# Patient Record
(demographics unavailable — no encounter records)

---

## 2017-02-19 NOTE — EDDOCDS
Physician Documentation                                                                           

[facility]                                                                         

Name: Rupa Hightower                                                                                

Age: 75 yrs                                                                                       

Sex: Female                                                                                       

: 1941                                                                                   

MRN: [id number]                                                                                     

Arrival Date: 2017                                                                          

Time: 23:59                                                                                       

Account#: [id number]                                                                              

Bed 15                                                                                            

Private MD:                                                                                       

Disposition:                                                                                      

17 03:24 Discharged to Home/Self Care. Impression: Nausea and vomiting, Diarrhea,           

unspecified, Hypomagnesemia.                                                                    

- Condition is Stable.                                                                            

- Discharge Instructions: Diarrhea, Hypomagnesemia, Nausea and Vomiting.                          

- Prescriptions for Zofran 4 mg Oral Tablet - take 1 tablet by ORAL route 4 times per             

day As needed; 10 tablet.                                                                       

- Medication Reconciliation, Local Pharmacy Hours form.                                           

- Follow up: Johnathon Fatima; When: Call to arrange an appointment; Reason: Continuance of            

care.                                                                                           

- Problem is an acute exacerbation.                                                               

- Symptoms have improved.                                                                         

                                                                                                  

                                                                                                  

                                                                                                  

Historical:                                                                                       

- Allergies: hydrocodone; lactose intolerant;                                                     

- Home Meds:                                                                                      

1. metoprolol tartrate 50 mg Oral tab 1 tab 2 times per day                                     

2. pantoprazole 40 mg oral TbEC 1 tab once daily                                                

3. montelukast 10 mg oral tab 1 tab once daily                                                  

4. meloxicam 15 mg oral tab 1 tab once daily                                                    

5. epinastine 0.05 % ophthalmic drop 1 drop 2 times per day                                     

6. fluticasone 50 mcg/actuation nasal spsn 2 sprays once daily                                  

7. atorvastatin 10 mg oral tab 3 times a weeks                                                  

8. Vagifem 10 mcg vaginal tab 1 tab 2 times per wk                                              

9. amoxicillin 500 mg Oral cap 4 caps prior to dental visit                                     

10. Vitamin D3 1,000 unit oral cap daily                                                        

11. B-Complex oral tab daily                                                                    

12. glucosamine-chondroitin oral oral                                                           

13. Allegra Allergy oral oral daily                                                             

14. aspirin 81 mg Oral tab 1 tab once daily                                                     

- PMHx: Non-Hodgkin's Lymphoma; congenital hip dislocation (left); Shingles;                      

mononucleosis; Hypertension; High Cholesterol; GERD;                                            

- PSHx: Appendectomy; Cholecystectomy; Hysterectomy; Tonsillectomy; Hip surgery;                  

surgery for endometriosis;                                                                      

- Social history: Smoking status: Patient states was never smoker of tobacco. No                  

barriers to communication noted, The patient speaks fluent English, Speaks                      

appropriately for age.                                                                          

- Family history: Not pertinent.                                                                  

- : The pt / caregiver states he / she is not on anticoagulants. Home medication list             

is obtained from the patient.                                                                   

- Exposure Risk Screening:: None identified.                                                      

                                                                                                  

                                                                                                  

Vital Signs:                                                                                      

                                                                                             

00:07  / 69; Pulse 88; Resp 18; Temp 96.5(TE); Pulse Ox 97% on R/A; Weight 79.38 kg /   [place] 

      175 lbs (R); Height 5 ft. 7 in. (170.18 cm) (R);                                            

03:38  / 83; Pulse 107; Resp 18; Temp 98.4(O); Pulse Ox 94% on R/A; Pain 0/10;          [place] 

00:07 Body Mass Index 27.41 (79.38 kg, 170.18 cm)                                             [place] 

                                                                                                  

MDM:                                                                                              

00:46 Ondansetron 4 mg IVP once ordered.                                                      mm11

00:46 IV Saline Lock ordered.                                                                 mm11

00:46 Undress patient appropriately for examination ordered.                                  mm11

00:46 LR Solution 1000 ml IV at bolus once ordered.                                           mm11

00:47 Amylase Ordered.                                                                        EDMS

00:47 Basic Metabolic Profile Ordered.                                                        EDMS

00:47 CBC with Diff Ordered.                                                                  EDMS

00:47 Cardiac Injury Profile Ordered.                                                         EDMS

00:47 Lipase Ordered.                                                                         EDMS

00:47 Liver Profile Ordered.                                                                  EDMS

00:47 Troponin Ordered.                                                                       EDMS

00:47 Magnesium Level Ordered.                                                                EDMS

00:47 NOTHING BY MOUTH+DIET ordered.                                                          EDMS

01:22 Financial registration complete.                                                        pm4 

02:00 Basic Metabolic Profile Reviewed.                                                       mm11

02:00 CBC with Diff Reviewed.                                                                 mm11

02:00 Liver Profile Reviewed.                                                                 mm11

02:00 Magnesium Level Reviewed.                                                               mm11

02:00 Amylase Reviewed.                                                                       mm11

02:00 Cardiac Injury Profile Reviewed.                                                        mm11

02:00 Lipase Reviewed.                                                                        mm11

02:00 Troponin Reviewed.                                                                      mm11

03:14 Magnesium Oxide 400 mg PO once ordered.                                                 mm11

03:17 [facility] Payment Agreement was scanned into [employer] and attached to record.               gjb 

                                                                                                  

Administered Medications:                                                                         

00:56 Drug: Ondansetron 4 mg [ondansetron HCl 2 mg/mL intravenous solution (2 mL)] Route:     [email or web address]

      IVP; Site: left antecubital;                                                                

00:56 Drug: LR 1000 ml [lactated ringers intravenous solution] Route: IV; Rate: bolus; Site:  js15

      left antecubital;                                                                           

03:58 Follow up: IV Intake: 600ml                                                             [email or web address]

03:58 Drug: Magnesium Oxide 400 mg [magnesium oxide 400 mg tablet (1 tabs)] Route: PO;        js15

                                                                                                  

                                                                                                  

Signatures:                                                                                       

Dispatcher [employer]                           Bob Gudino DO DO   mm11                                                 

Ursula AndersonRN                        RN   [id number]                                                 

Shannon Benites Paul, Reg                     Reg  pm4                                                  

                                                                                                  

The chart was reviewed and I authenticate all verbal orders and agree with the evaluation and 
treatment provided.Attachments:

03:17 [facility] Payment Agreement                                                                daya 

                                                                                                  

**************************************************************************************************

MTDD

## 2017-02-19 NOTE — EDDOCDS
Nurse's Notes                                                                                     

[facility]                                                                         

Name: Rupa Hightower                                                                                

Age: 75 yrs                                                                                       

Sex: Female                                                                                       

: 1941                                                                                   

MRN: [id number]                                                                                     

Arrival Date: 2017                                                                          

Time: 23:59                                                                                       

Account#: [id number]                                                                              

Bed 15                                                                                            

Private MD:                                                                                       

Diagnosis: Nausea and vomiting;Diarrhea, unspecified;Hypomagnesemia                               

                                                                                                  

Presentation:                                                                                     

                                                                                             

00:07 Presenting complaint: EMS states: Pt has N/V/D that started earlier this evening; pt    js15

      states that she has hx of becoming very dehydrated when this happens. Adult Sepsis          

      Screening: The patient does not have new or worsening altered mentation. Patient's          

      respiratory rate is less than 22. Systolic blood pressure is greater than 100. Patient      

      has a qSOFA score of 0- Negative Sepsis Screen. Suicide/Homicide risk assessment- the       

      patient denies having any suicidal and/or homicidal ideations and does not present with     

      any other emotional, behavioral or mental health complaints.  Status: Patient       

      is not a  or  dependent. Transition of care: patient was not          

      received from another setting of care.                                                      

00:07 Acuity: IDA Level 3                                                                     js15

00:07 Method Of Arrival: Ambulance                                                            js15

                                                                                                  

Triage Assessment:                                                                                

00:18 General: Appears uncomfortable, Behavior is anxious, appropriate for age, cooperative.  js15

      Pain: Location: back Pain currently is 6 out of 10 on a pain scale. The patient is          

      triaged at the bedside. See Assessment in Nurses Notes section of ED record.                

      Neurological: Level of Consciousness is awake, alert, obeys commands, Oriented to           

      person, place, time. Respiratory: Airway is patent Respiratory effort is even,              

      unlabored, Respiratory pattern is regular, symmetrical. Derm: Skin is pink, warm & dry.   

                                                                                                  

Historical:                                                                                       

- Allergies: hydrocodone; lactose intolerant;                                                     

- Home Meds:                                                                                      

1. metoprolol tartrate 50 mg Oral tab 1 tab 2 times per day                                     

2. pantoprazole 40 mg oral TbEC 1 tab once daily                                                

3. montelukast 10 mg oral tab 1 tab once daily                                                  

4. meloxicam 15 mg oral tab 1 tab once daily                                                    

5. epinastine 0.05 % ophthalmic drop 1 drop 2 times per day                                     

6. fluticasone 50 mcg/actuation nasal spsn 2 sprays once daily                                  

7. atorvastatin 10 mg oral tab 3 times a weeks                                                  

8. Vagifem 10 mcg vaginal tab 1 tab 2 times per wk                                              

9. amoxicillin 500 mg Oral cap 4 caps prior to dental visit                                     

10. Vitamin D3 1,000 unit oral cap daily                                                        

11. B-Complex oral tab daily                                                                    

12. glucosamine-chondroitin oral oral                                                           

13. Allegra Allergy oral oral daily                                                             

14. aspirin 81 mg Oral tab 1 tab once daily                                                     

- PMHx: Non-Hodgkin's Lymphoma; congenital hip dislocation (left); Shingles;                      

mononucleosis; Hypertension; High Cholesterol; GERD;                                            

- PSHx: Appendectomy; Cholecystectomy; Hysterectomy; Tonsillectomy; Hip surgery;                  

surgery for endometriosis;                                                                      

- Social history: Smoking status: Patient states was never smoker of tobacco. No                  

barriers to communication noted, The patient speaks fluent English, Speaks                      

appropriately for age.                                                                          

- Family history: Not pertinent.                                                                  

- : The pt / caregiver states he / she is not on anticoagulants. Home medication list             

is obtained from the patient.                                                                   

- Exposure Risk Screening:: None identified.                                                      

                                                                                                  

                                                                                                  

Screenin:54 Screening information is obtained from the patient. Fall risk: No risks identified.     js15

      Assistance ADL's: requires no assistance with activities of daily living. Abuse/DV          

      Screen: The patient / caregiver reports he/she is: not in a situation that causes fear,     

      pain or injury. Nutritional screening: No deficits noted. Advance Directives: There is      

      no active DNR order. home support is adequate.                                              

                                                                                                  

Assessment:                                                                                       

00:15 General: see triage note.                                                               js15

01:52 Reassessment: Patient appears in no apparent distress at this time. Patient states      js15

      feeling better. Patient states symptoms have improved. Pt resting on stretcher, using       

      ipad; respirations even and unlabored; skin pink, warm, dry.                                

02:45 Reassessment: Patient appears in no apparent distress at this time. Patient states      js15

      feeling better. Patient states symptoms have improved. Pt resting on stretcher,             

      respirations even and unlabored; skin pink, warm, dry.                                      

03:59 General: Appears in no apparent distress, comfortable, Behavior is appropriate for age, js15

      cooperative. Pain: Denies pain. Neurological: Level of Consciousness is awake, alert,       

      obeys commands, Oriented to person, place, time. Respiratory: Airway is patent              

      Respiratory effort is even, unlabored, Respiratory pattern is regular, symmetrical. GI:     

      Bowel sounds present X 4 quads. Abd is soft and non tender X 4 quads. Derm: Skin is         

      pink, warm & dry.                                                                         

                                                                                                  

Vital Signs:                                                                                      

00:07  / 69; Pulse 88; Resp 18; Temp 96.5(TE); Pulse Ox 97% on R/A; Weight 79.38 kg     [place] 

      (R); Height 5 ft. 7 in. (170.18 cm) (R);                                                    

03:38  / 83; Pulse 107; Resp 18; Temp 98.4(O); Pulse Ox 94% on R/A; Pain 0/10;          [place] 

00:07 Body Mass Index 27.41 (79.38 kg, 170.18 cm)                                             [place] 

                                                                                                  

Vitals:                                                                                           

00:18 Log In Time N/A - ambulance arrival.                                                    js15

                                                                                                  

ED Course:                                                                                        

00:01 Patient visited by Lopresti, Mary-Elizabeth, Unit Clerk.                                ml3 

00:01 Danielle Vargas,JAMIL is Primary Nurse.                                                  ml3 

00:01 Patient moved to Waiting                                                                ml3 

00:01 Patient moved to 15                                                                     ml3 

00:07 Patient visited by Christina Pruett PCA.                                                 [place] 

00:09 Triage Initiated                                                                        js15

00:39 Bob Jacob DO is Attending Physician.                                            mm11

00:39 Patient visited by Bob Jacob DO.                                                mm11

00:45 Patient visited by Bob Jacob DO.                                                mm11

00:50 Inserted saline lock: 20 gauge in left antecubital area The patient tolerated the       js15

      procedure well.                                                                             

00:51 Magnesium Level Sent.                                                                   js15

00:51 Amylase Sent.                                                                           js15

00:51 Basic Metabolic Profile Sent.                                                           js15

00:51 CBC with Diff Sent.                                                                     js15

00:51 Cardiac Injury Profile Sent.                                                            js15

00:51 Lipase Sent.                                                                            js15

00:51 Liver Profile Sent.                                                                     js15

00:51 Troponin Sent.                                                                          js15

00:54 Primary Nurse role handed off by Danielle Vargas,JAMIL                                   [place] 

01:50 Patient visited by Ursula Anderson RN.                                                    js15

03:04 Patient visited by Christina Pruett PCA.                                                 [place] 

03:16 Patient name changed from Rupa\S\\S\Inwood\S\ to Rupa\S\ \S\Inwood.                          
   EDMS

03:17 [facility] Payment Agreement was scanned into [employer] and attached to record.               gjb 

03:23 Johnathon Fatima is Referral Physician.                                                      mm11

03:39 Patient visited by Christina Pruett PCA.                                                 [place] 

03:59 The patient / caregiver is instructed regarding the plan of care and ED course.         js15

04:01 Discontinued IV lock intact, bleeding controlled, pressure dressing applied, No         js15

      redness/swelling at site. No procedures done that require assistance.                       

                                                                                                  

Administered Medications:                                                                         

00:56 Drug: Ondansetron 4 mg [ondansetron HCl 2 mg/mL intravenous solution (2 mL)] Route:     js15

      IVP; Site: left antecubital;                                                                

00:56 Drug: LR 1000 ml [lactated ringers intravenous solution] Route: IV; Rate: bolus; Site:  js15

      left antecubital;                                                                           

03:58 Follow up: IV Intake: 600ml                                                             js15

03:58 Drug: Magnesium Oxide 400 mg [magnesium oxide 400 mg tablet (1 tabs)] Route: PO;        js15

                                                                                                  

                                                                                                  

Intake:                                                                                           

03:58 IV: 600.00ml; Total: 600.00ml.                                                          js15

                                                                                                  

Order Results:                                                                                    

Lab Order: Amylase; SPEC'M 17 00:49                                                         

      Test: AMYLASE; Value: 47; Range: ; Units: U/L; Status: F                              

Lab Order: Basic Metabolic Profile; SPEC'M 17 00:49                                         

      Test: GLUCOSE, FASTING; Value: 106; Range: ; Units: MG/DL; Status: F                  

      Test: BLOOD UREA NITROGEN; Value: 19; Range: 7-18; Abnormal: Above high normal; Units:      

      MG/DL; Status: F                                                                            

      Test: CREATININE FOR GFR; Value: 1.21; Range: 0.55-1.02; Abnormal: Above high normal;       

      Units: MG/DL; Status: F                                                                     

      Test: GLOMERULAR FILTRATION RATE; Value: 46.2; Range: >39; Status: F                        

      Test: SODIUM LEVEL; Value: 140; Range: 136-145; Units: MEQ/L; Status: F                     

      Test: POTASSIUM SERUM; Value: 4.2; Range: 3.5-5.1; Units: MEQ/L; Status: F                  

      Test: CHLORIDE LEVEL; Value: 103; Range: ; Units: MEQ/L; Status: F                    

      Test: CARBON DIOXIDE LEVEL; Value: 25; Range: 21-32; Units: MEQ/L; Status: F                

      Test: ANION GAP; Value: 12; Range: 8-16; Units: MEQ/L; Status: F                            

      Test: CALCIUM LEVEL; Value: 9.4; Range: 8.8-10.2; Units: MG/DL; Status: F                   

      Test Note: &nbsp;; Units are mL/min/1.73 m2 Chronic Kidney Disease Staging per NKF:       

      Stage I & II GFR >=60 Normal to Mildly Decreased Stage III GFR 30-59 Moderately           

      Decreased Stage IV GFR 15-29 Severely Decreased Stage V GFR <15 Very Little GFR Left        

      ESRD GFR <15 on RRT                                                                         

Lab Order: CBC with Diff; SPEC'M 17 00:49                                                   

      Test: WHITE BLOOD COUNT; Value: 14.8; Range: 4.0-10.0; Abnormal: Above high normal;         

      Units: K/mm3; Status: F                                                                     

      Test: RED BLOOD COUNT; Value: 5.31; Range: 4.00-5.40; Units: M/mm3; Status: F               

      Test: HEMOGLOBIN; Value: 17.2; Range: 12.0-16.0; Abnormal: Above high normal; Units:        

      g/dl; Status: F                                                                             

      Test: HEMATOCRIT; Value: 52.1; Range: 36.0-47.0; Abnormal: Above high normal; Units: %;     

      Status: F                                                                                   

      Test: MEAN CORPUSCULAR VOLUME; Value: 98.1; Range: 80.0-96.0; Abnormal: Above high          

      normal; Units: fl; Status: F                                                                

      Test: MEAN CORPUSCULAR HEMOGLOBIN; Value: 32.4; Range: 27.0-33.0; Units: pg; Status: F      

      Test: MEAN CORPUSCULAR HGB CONC; Value: 33.1; Range: 32.0-36.5; Units: g/dl; Status: F      

      Test: RED CELL DISTRIBUTION WIDTH; Value: 13.0; Range: 11.5-14.5; Units: %; Status: F       

      Test: PLATELET COUNT, AUTOMATED; Value: 217; Range: 150-450; Units: k/mm3; Status: F        

      Test: NEUTROPHILS %; Value: 86.2; Range: 36.0-66.0; Abnormal: Above high normal; Units:     

      %; Status: F                                                                                

      Test: LYMPH %; Value: 6.0; Range: 24.0-44.0; Abnormal: Below low normal; Units: %;          

      Status: F                                                                                   

      Test: MONO %; Value: 6.0; Range: 0.0-5.0; Abnormal: Above high normal; Units: %;            

      Status: F                                                                                   

      Test: EOS %; Value: 0.9; Range: 0.0-3.0; Units: %; Status: F                                

      Test: BASO %; Value: 0.2; Range: 0.0-1.0; Units: %; Status: F                               

      Test: LARGE UNSTAINED CELL %; Value: 0.8; Range: 0.0-4.0; Units: %; Status: F               

      Test: NEUTROPHILS #; Value: 12.7; Range: 1.8-7.7; Abnormal: Above high normal; Units:       

      K/mm3; Status: F                                                                            

      Test: LYMPH #; Value: 1.0; Range: 1.5-4.5; Abnormal: Below low normal; Units: K/mm3;        

      Status: F                                                                                   

      Test: MONO #; Value: 0.9; Range: 0.0-0.8; Abnormal: Above high normal; Units: K/mm3;        

      Status: F                                                                                   

      Test: EOS #; Value: 0.1; Range: 0.0-0.50; Units: K/mm3; Status: F                           

      Test: BASO #; Value: 0.0; Range: 0.0-0.2; Units: K/mm3; Status: F                           

      Test: LARGE UNSTAINED CELL #; Value: 0.1; Range: 0.0-0.4; Units: K/mm3; Status: F           

Lab Order: Cardiac Injury Profile; SPEC' 17 00:49                                          

      Test: CPK CREATINE PHOSPHOKINASE; Value: 82; Range: ; Units: U/L; Status: F           

      Test: CK-MB VALUE MASS; Value: 1.0; Range: 0.0-3.6; Units: NG/ML; Status: F                 

      Test: MB/CK RELATIVE INDEX; Value: 1.21; Range: < OR =4; Status: F                          

      Test Note: &nbsp;; DIAGNOSIS CRITERIA MMB ng/ml Relative Index (RI) NON-AMI < or = 5      

      N/A GRAY ZONE > 5 < or = 4 AMI > 5 > 4                                                      

Lab Order: Lipase; SPEC' 17 00:49                                                          

      Test: LIPASE; Value: 165; Range: ; Units: U/L; Status: F                              

Lab Order: Liver Profile; SPEC' 17 00:49                                                   

      Test: AST/SGOT; Value: 39; Range: 15-37; Abnormal: Above high normal; Units: U/L;           

      Status: F                                                                                   

      Test: ALT/SGPT; Value: 45; Range: 12-78; Units: U/L; Status: F                              

      Test: ALKALINE PHOSPHATASE; Value: 88; Range: ; Units: U/L; Status: F                 

      Test: BILIRUBIN,TOTAL; Value: 0.4; Range: 0.2-1.0; Units: MG/DL; Status: F                  

      Test: BILIRUBIN,DIRECT; Value: < 0.1; Range: 0.0-0.2; Units: MG/DL; Status: F               

      Test: TOTAL PROTEIN; Value: 7.2; Range: 6.4-8.2; Units: GM/DL; Status: F                    

      Test: ALBUMIN; Value: 4.1; Range: 3.2-5.2; Units: GM/DL; Status: F                          

      Test: ALBUMIN/GLOBULIN RATIO; Value: 1.32; Range: 1.00-1.93; Status: F                      

Lab Order: Troponin; SPEC' 17 00:49                                                        

      Test: TROPONIN I; Value: < 0.02; Range: < 0.10; Units: NG/ML; Status: F                     

      Test Note: &nbsp;; Troponin I Reference Interval for Siemens [place] LOCI: 99th             

      Percentile= 0.00-0.045 ng/ml Risk Stratification: <= 0.10 ng/ml Decreased Risk for          

      Adverse Clinical Events. 0.10-1.50 ng/ml Increased Risk for Adverse Clinical Events.        

      Evaluation of additional criterion and/or repeat testing in 2-6 hours is suggested to       

      rule out myocardial damage. >= 1.50 ng/ml Indicative of Myocardial Injury.                  

Lab Order: Magnesium Level; SPEC'M 17 00:49                                                 

      Test: MAGNESIUM LEVEL; Value: 1.7; Range: 1.8-2.4; Abnormal: Below low normal; Units:       

      MG/DL; Status: F                                                                            

                                                                                                  

Outcome:                                                                                          

03:24 Discharge ordered by Provider.                                                          mm11

04:01 Discharge Assessment: Patient awake, alert and oriented x 3. No cognitive and/or        js15

      functional deficits noted. Patient verbalized understanding of disposition                  

      instructions. patient administered narcotics - no. The following High Risk Discharge        

      criteria are identified: None. Discharged to home via wheelchair. Condition: stable.        

      Discharge instructions given to patient, Instructed on discharge instructions, follow       

      up and referral plans. medication usage, Demonstrated understanding of instructions,        

      medications, Pt was receptive of discharge instructions/ teaching. Prescriptions given      

      X 1. No special radiology studies were completed. Property sent home with patient.          

04:01 Patient left the ED.                                                                    js15

                                                                                                  

Signatures:                                                                                       

Dispatcher [employer]                           EDMS                                                 

Lopresti, Mary-Elizabeth, Unit Clerk    Unit ml3                                                  

Bob Jacob,                     DO   mm11                                                 

Christina Pruett, PCA                     PCA  Ursula Gallagher,RN                        RN   js15                                                 

Shannon Benites                                                  

                                                                                                  

**************************************************************************************************

MTDD

## 2017-02-21 NOTE — EDDOCDS
Physician Documentation                                                                           

[facility]                                                                         

Name: Rupa Hightower                                                                                

Age: 75 yrs                                                                                       

Sex: Female                                                                                       

: 1941                                                                                   

MRN: [id number]                                                                                     

Arrival Date: 2017                                                                          

Time: 23:59                                                                                       

Account#: [id number]                                                                              

Bed 15                                                                                            

Private MD:                                                                                       

Disposition:                                                                                      

17 03:24 Discharged to Home/Self Care. Impression: Nausea and vomiting, Diarrhea,           

unspecified, Hypomagnesemia.                                                                    

- Condition is Stable.                                                                            

- Discharge Instructions: Diarrhea, Hypomagnesemia, Nausea and Vomiting.                          

- Prescriptions for Zofran 4 mg Oral Tablet - take 1 tablet by ORAL route 4 times per             

day As needed; 10 tablet.                                                                       

- Medication Reconciliation, Local Pharmacy Hours form.                                           

- Follow up: Johnathon Fatima; When: Call to arrange an appointment; Reason: Continuance of            

care.                                                                                           

- Problem is an acute exacerbation.                                                               

- Symptoms have improved.                                                                         

                                                                                                  

                                                                                                  

                                                                                                  

Historical:                                                                                       

- Allergies: hydrocodone; lactose intolerant;                                                     

- Home Meds:                                                                                      

1. metoprolol tartrate 50 mg Oral tab 1 tab 2 times per day                                     

2. pantoprazole 40 mg oral TbEC 1 tab once daily                                                

3. montelukast 10 mg oral tab 1 tab once daily                                                  

4. meloxicam 15 mg oral tab 1 tab once daily                                                    

5. epinastine 0.05 % ophthalmic drop 1 drop 2 times per day                                     

6. fluticasone 50 mcg/actuation nasal spsn 2 sprays once daily                                  

7. atorvastatin 10 mg oral tab 3 times a weeks                                                  

8. Vagifem 10 mcg vaginal tab 1 tab 2 times per wk                                              

9. amoxicillin 500 mg Oral cap 4 caps prior to dental visit                                     

10. Vitamin D3 1,000 unit oral cap daily                                                        

11. B-Complex oral tab daily                                                                    

12. glucosamine-chondroitin oral oral                                                           

13. Allegra Allergy oral oral daily                                                             

14. aspirin 81 mg Oral tab 1 tab once daily                                                     

- PMHx: Non-Hodgkin's Lymphoma; congenital hip dislocation (left); Shingles;                      

mononucleosis; Hypertension; High Cholesterol; GERD;                                            

- PSHx: Appendectomy; Cholecystectomy; Hysterectomy; Tonsillectomy; Hip surgery;                  

surgery for endometriosis;                                                                      

- Social history: Smoking status: Patient states was never smoker of tobacco. No                  

barriers to communication noted, The patient speaks fluent English, Speaks                      

appropriately for age.                                                                          

- Family history: Not pertinent.                                                                  

- : The pt / caregiver states he / she is not on anticoagulants. Home medication list             

is obtained from the patient.                                                                   

- Exposure Risk Screening:: None identified.                                                      

                                                                                                  

                                                                                                  

Vital Signs:                                                                                      

                                                                                             

00:07  / 69; Pulse 88; Resp 18; Temp 96.5(TE); Pulse Ox 97% on R/A; Weight 79.38 kg /   [place] 

      175 lbs (R); Height 5 ft. 7 in. (170.18 cm) (R);                                            

03:38  / 83; Pulse 107; Resp 18; Temp 98.4(O); Pulse Ox 94% on R/A; Pain 0/10;          [place] 

00:07 Body Mass Index 27.41 (79.38 kg, 170.18 cm)                                             [place] 

                                                                                                  

MDM:                                                                                              

00:46 Ondansetron 4 mg IVP once ordered.                                                      mm11

00:46 IV Saline Lock ordered.                                                                 mm11

00:46 Undress patient appropriately for examination ordered.                                  mm11

00:46 LR Solution 1000 ml IV at bolus once ordered.                                           mm11

00:47 Amylase Ordered.                                                                        EDMS

00:47 Basic Metabolic Profile Ordered.                                                        EDMS

00:47 CBC with Diff Ordered.                                                                  EDMS

00:47 Cardiac Injury Profile Ordered.                                                         EDMS

00:47 Lipase Ordered.                                                                         EDMS

00:47 Liver Profile Ordered.                                                                  EDMS

00:47 Troponin Ordered.                                                                       EDMS

00:47 Magnesium Level Ordered.                                                                EDMS

00:47 NOTHING BY MOUTH+DIET ordered.                                                          EDMS

01:22 Financial registration complete.                                                        pm4 

02:00 Basic Metabolic Profile Reviewed.                                                       mm11

02:00 CBC with Diff Reviewed.                                                                 mm11

02:00 Liver Profile Reviewed.                                                                 mm11

02:00 Magnesium Level Reviewed.                                                               mm11

02:00 Amylase Reviewed.                                                                       mm11

02:00 Cardiac Injury Profile Reviewed.                                                        mm11

02:00 Lipase Reviewed.                                                                        mm11

02:00 Troponin Reviewed.                                                                      mm11

03:14 Magnesium Oxide 400 mg PO once ordered.                                                 mm11

03:17 [facility] Payment Agreement was scanned into [employer] and attached to record.               gjb 

17:12 T-Sheet-- Draft Copy was scanned into [employer] and attached to record.                   klr 

                                                                                                  

Administered Medications:                                                                         

00:56 Drug: Ondansetron 4 mg [ondansetron HCl 2 mg/mL intravenous solution (2 mL)] Route:     [email or web address]

      IVP; Site: left antecubital;                                                                

00:56 Drug: LR 1000 ml [lactated ringers intravenous solution] Route: IV; Rate: bolus; Site:  js15

      left antecubital;                                                                           

03:58 Follow up: IV Intake: 600ml                                                             [email or web address]

03:58 Drug: Magnesium Oxide 400 mg [magnesium oxide 400 mg tablet (1 tabs)] Route: PO;        js15

                                                                                                  

                                                                                                  

Signatures:                                                                                       

Dispatcher [employer]                           Bob Gudino DO                    DO   mm11                                                 

Ursula Anderson,RN                        RN   [id number]                                                 

Shannon Benites Kathie klr Montondo, Paul, Reg                     Reg  pm4                                                  

                                                                                                  

The chart was reviewed and I authenticate all verbal orders and agree with the evaluation and 
treatment provided.Attachments:

03:17 [facility] Payment Agreement                                                                gjmaria victoria 

17:12 T-Sheet-- Draft Copy                                                                    klr 

                                                                                                  

**************************************************************************************************



*** Chart Complete ***
MTDD

## 2017-02-21 NOTE — EDDOCDS
Nurse's Notes                                                                                     

[facility]                                                                         

Name: Rupa Hightower                                                                                

Age: 75 yrs                                                                                       

Sex: Female                                                                                       

: 1941                                                                                   

MRN: [id number]                                                                                     

Arrival Date: 2017                                                                          

Time: 23:59                                                                                       

Account#: [id number]                                                                              

Bed 15                                                                                            

Private MD:                                                                                       

Diagnosis: Nausea and vomiting;Diarrhea, unspecified;Hypomagnesemia                               

                                                                                                  

Presentation:                                                                                     

                                                                                             

00:07 Presenting complaint: EMS states: Pt has N/V/D that started earlier this evening; pt    js15

      states that she has hx of becoming very dehydrated when this happens. Adult Sepsis          

      Screening: The patient does not have new or worsening altered mentation. Patient's          

      respiratory rate is less than 22. Systolic blood pressure is greater than 100. Patient      

      has a qSOFA score of 0- Negative Sepsis Screen. Suicide/Homicide risk assessment- the       

      patient denies having any suicidal and/or homicidal ideations and does not present with     

      any other emotional, behavioral or mental health complaints.  Status: Patient       

      is not a  or  dependent. Transition of care: patient was not          

      received from another setting of care.                                                      

00:07 Acuity: DIA Level 3                                                                     js15

00:07 Method Of Arrival: Ambulance                                                            js15

                                                                                                  

Triage Assessment:                                                                                

00:18 General: Appears uncomfortable, Behavior is anxious, appropriate for age, cooperative.  js15

      Pain: Location: back Pain currently is 6 out of 10 on a pain scale. The patient is          

      triaged at the bedside. See Assessment in Nurses Notes section of ED record.                

      Neurological: Level of Consciousness is awake, alert, obeys commands, Oriented to           

      person, place, time. Respiratory: Airway is patent Respiratory effort is even,              

      unlabored, Respiratory pattern is regular, symmetrical. Derm: Skin is pink, warm & dry.   

                                                                                                  

Historical:                                                                                       

- Allergies: hydrocodone; lactose intolerant;                                                     

- Home Meds:                                                                                      

1. metoprolol tartrate 50 mg Oral tab 1 tab 2 times per day                                     

2. pantoprazole 40 mg oral TbEC 1 tab once daily                                                

3. montelukast 10 mg oral tab 1 tab once daily                                                  

4. meloxicam 15 mg oral tab 1 tab once daily                                                    

5. epinastine 0.05 % ophthalmic drop 1 drop 2 times per day                                     

6. fluticasone 50 mcg/actuation nasal spsn 2 sprays once daily                                  

7. atorvastatin 10 mg oral tab 3 times a weeks                                                  

8. Vagifem 10 mcg vaginal tab 1 tab 2 times per wk                                              

9. amoxicillin 500 mg Oral cap 4 caps prior to dental visit                                     

10. Vitamin D3 1,000 unit oral cap daily                                                        

11. B-Complex oral tab daily                                                                    

12. glucosamine-chondroitin oral oral                                                           

13. Allegra Allergy oral oral daily                                                             

14. aspirin 81 mg Oral tab 1 tab once daily                                                     

- PMHx: Non-Hodgkin's Lymphoma; congenital hip dislocation (left); Shingles;                      

mononucleosis; Hypertension; High Cholesterol; GERD;                                            

- PSHx: Appendectomy; Cholecystectomy; Hysterectomy; Tonsillectomy; Hip surgery;                  

surgery for endometriosis;                                                                      

- Social history: Smoking status: Patient states was never smoker of tobacco. No                  

barriers to communication noted, The patient speaks fluent English, Speaks                      

appropriately for age.                                                                          

- Family history: Not pertinent.                                                                  

- : The pt / caregiver states he / she is not on anticoagulants. Home medication list             

is obtained from the patient.                                                                   

- Exposure Risk Screening:: None identified.                                                      

                                                                                                  

                                                                                                  

Screenin:54 Screening information is obtained from the patient. Fall risk: No risks identified.     js15

      Assistance ADL's: requires no assistance with activities of daily living. Abuse/DV          

      Screen: The patient / caregiver reports he/she is: not in a situation that causes fear,     

      pain or injury. Nutritional screening: No deficits noted. Advance Directives: There is      

      no active DNR order. home support is adequate.                                              

                                                                                                  

Assessment:                                                                                       

00:15 General: see triage note.                                                               js15

01:52 Reassessment: Patient appears in no apparent distress at this time. Patient states      js15

      feeling better. Patient states symptoms have improved. Pt resting on stretcher, using       

      ipad; respirations even and unlabored; skin pink, warm, dry.                                

02:45 Reassessment: Patient appears in no apparent distress at this time. Patient states      js15

      feeling better. Patient states symptoms have improved. Pt resting on stretcher,             

      respirations even and unlabored; skin pink, warm, dry.                                      

03:59 General: Appears in no apparent distress, comfortable, Behavior is appropriate for age, js15

      cooperative. Pain: Denies pain. Neurological: Level of Consciousness is awake, alert,       

      obeys commands, Oriented to person, place, time. Respiratory: Airway is patent              

      Respiratory effort is even, unlabored, Respiratory pattern is regular, symmetrical. GI:     

      Bowel sounds present X 4 quads. Abd is soft and non tender X 4 quads. Derm: Skin is         

      pink, warm & dry.                                                                         

                                                                                                  

Vital Signs:                                                                                      

00:07  / 69; Pulse 88; Resp 18; Temp 96.5(TE); Pulse Ox 97% on R/A; Weight 79.38 kg     [place] 

      (R); Height 5 ft. 7 in. (170.18 cm) (R);                                                    

03:38  / 83; Pulse 107; Resp 18; Temp 98.4(O); Pulse Ox 94% on R/A; Pain 0/10;          [place] 

00:07 Body Mass Index 27.41 (79.38 kg, 170.18 cm)                                             [place] 

                                                                                                  

Vitals:                                                                                           

00:18 Log In Time N/A - ambulance arrival.                                                    js15

                                                                                                  

ED Course:                                                                                        

00:01 Patient visited by Lopresti, Mary-Elizabeth, Unit Clerk.                                ml3 

00:01 Danielle Vargas,JAMIL is Primary Nurse.                                                  ml3 

00:01 Patient moved to Waiting                                                                ml3 

00:01 Patient moved to 15                                                                     ml3 

00:07 Patient visited by Christina Pruett PCA.                                                 [place] 

00:09 Triage Initiated                                                                        js15

00:39 Bob Jacob DO is Attending Physician.                                            mm11

00:39 Patient visited by Bob Jacob DO.                                                mm11

00:45 Patient visited by Bob Jacob DO.                                                mm11

00:50 Inserted saline lock: 20 gauge in left antecubital area The patient tolerated the       js15

      procedure well.                                                                             

00:51 Magnesium Level Sent.                                                                   js15

00:51 Amylase Sent.                                                                           js15

00:51 Basic Metabolic Profile Sent.                                                           js15

00:51 CBC with Diff Sent.                                                                     js15

00:51 Cardiac Injury Profile Sent.                                                            js15

00:51 Lipase Sent.                                                                            js15

00:51 Liver Profile Sent.                                                                     js15

00:51 Troponin Sent.                                                                          js15

00:54 Primary Nurse role handed off by Danielle Vargas,JAMIL                                   [place] 

01:50 Patient visited by Ursula Anderson RN.                                                    js15

03:04 Patient visited by Christina Pruett PCA.                                                 [place] 

03:16 Patient name changed from Rupa\S\\S\[place]\S\ to Rupa\S\ \S\[place].                          
   EDMS

03:17 [facility] Payment Agreement was scanned into [employer] and attached to record.               gjb 

03:23 Johnathon Fatima is Referral Physician.                                                      mm11

03:39 Patient visited by Christina Pruett PCA.                                                 [place] 

03:59 The patient / caregiver is instructed regarding the plan of care and ED course.         js15

04:01 Discontinued IV lock intact, bleeding controlled, pressure dressing applied, No         js15

      redness/swelling at site. No procedures done that require assistance.                       

17:12 T-Sheet-- Draft Copy was scanned into [employer] and attached to record.                   klr 

                                                                                                  

Administered Medications:                                                                         

00:56 Drug: Ondansetron 4 mg [ondansetron HCl 2 mg/mL intravenous solution (2 mL)] Route:     js15

      IVP; Site: left antecubital;                                                                

00:56 Drug: LR 1000 ml [lactated ringers intravenous solution] Route: IV; Rate: bolus; Site:  js15

      left antecubital;                                                                           

03:58 Follow up: IV Intake: 600ml                                                             js15

03:58 Drug: Magnesium Oxide 400 mg [magnesium oxide 400 mg tablet (1 tabs)] Route: PO;        js15

                                                                                                  

                                                                                                  

Intake:                                                                                           

03:58 IV: 600.00ml; Total: 600.00ml.                                                          js15

                                                                                                  

Order Results:                                                                                    

Lab Order: Amylase; SPEC'M 17 00:49                                                         

      Test: AMYLASE; Value: 47; Range: ; Units: U/L; Status: F                              

Lab Order: Basic Metabolic Profile; SPEC'M 17 00:49                                         

      Test: GLUCOSE, FASTING; Value: 106; Range: ; Units: MG/DL; Status: F                  

      Test: BLOOD UREA NITROGEN; Value: 19; Range: 7-18; Abnormal: Above high normal; Units:      

      MG/DL; Status: F                                                                            

      Test: CREATININE FOR GFR; Value: 1.21; Range: 0.55-1.02; Abnormal: Above high normal;       

      Units: MG/DL; Status: F                                                                     

      Test: GLOMERULAR FILTRATION RATE; Value: 46.2; Range: >39; Status: F                        

      Test: SODIUM LEVEL; Value: 140; Range: 136-145; Units: MEQ/L; Status: F                     

      Test: POTASSIUM SERUM; Value: 4.2; Range: 3.5-5.1; Units: MEQ/L; Status: F                  

      Test: CHLORIDE LEVEL; Value: 103; Range: ; Units: MEQ/L; Status: F                    

      Test: CARBON DIOXIDE LEVEL; Value: 25; Range: 21-32; Units: MEQ/L; Status: F                

      Test: ANION GAP; Value: 12; Range: 8-16; Units: MEQ/L; Status: F                            

      Test: CALCIUM LEVEL; Value: 9.4; Range: 8.8-10.2; Units: MG/DL; Status: F                   

      Test Note: &nbsp;; Units are mL/min/1.73 m2 Chronic Kidney Disease Staging per NKF:       

      Stage I & II GFR >=60 Normal to Mildly Decreased Stage III GFR 30-59 Moderately           

      Decreased Stage IV GFR 15-29 Severely Decreased Stage V GFR <15 Very Little GFR Left        

      ESRD GFR <15 on RRT                                                                         

Lab Order: CBC with Diff; SPEC'M 17 00:49                                                   

      Test: WHITE BLOOD COUNT; Value: 14.8; Range: 4.0-10.0; Abnormal: Above high normal;         

      Units: K/mm3; Status: F                                                                     

      Test: RED BLOOD COUNT; Value: 5.31; Range: 4.00-5.40; Units: M/mm3; Status: F               

      Test: HEMOGLOBIN; Value: 17.2; Range: 12.0-16.0; Abnormal: Above high normal; Units:        

      g/dl; Status: F                                                                             

      Test: HEMATOCRIT; Value: 52.1; Range: 36.0-47.0; Abnormal: Above high normal; Units: %;     

      Status: F                                                                                   

      Test: MEAN CORPUSCULAR VOLUME; Value: 98.1; Range: 80.0-96.0; Abnormal: Above high          

      normal; Units: fl; Status: F                                                                

      Test: MEAN CORPUSCULAR HEMOGLOBIN; Value: 32.4; Range: 27.0-33.0; Units: pg; Status: F      

      Test: MEAN CORPUSCULAR HGB CONC; Value: 33.1; Range: 32.0-36.5; Units: g/dl; Status: F      

      Test: RED CELL DISTRIBUTION WIDTH; Value: 13.0; Range: 11.5-14.5; Units: %; Status: F       

      Test: PLATELET COUNT, AUTOMATED; Value: 217; Range: 150-450; Units: k/mm3; Status: F        

      Test: NEUTROPHILS %; Value: 86.2; Range: 36.0-66.0; Abnormal: Above high normal; Units:     

      %; Status: F                                                                                

      Test: LYMPH %; Value: 6.0; Range: 24.0-44.0; Abnormal: Below low normal; Units: %;          

      Status: F                                                                                   

      Test: MONO %; Value: 6.0; Range: 0.0-5.0; Abnormal: Above high normal; Units: %;            

      Status: F                                                                                   

      Test: EOS %; Value: 0.9; Range: 0.0-3.0; Units: %; Status: F                                

      Test: BASO %; Value: 0.2; Range: 0.0-1.0; Units: %; Status: F                               

      Test: LARGE UNSTAINED CELL %; Value: 0.8; Range: 0.0-4.0; Units: %; Status: F               

      Test: NEUTROPHILS #; Value: 12.7; Range: 1.8-7.7; Abnormal: Above high normal; Units:       

      K/mm3; Status: F                                                                            

      Test: LYMPH #; Value: 1.0; Range: 1.5-4.5; Abnormal: Below low normal; Units: K/mm3;        

      Status: F                                                                                   

      Test: MONO #; Value: 0.9; Range: 0.0-0.8; Abnormal: Above high normal; Units: K/mm3;        

      Status: F                                                                                   

      Test: EOS #; Value: 0.1; Range: 0.0-0.50; Units: K/mm3; Status: F                           

      Test: BASO #; Value: 0.0; Range: 0.0-0.2; Units: K/mm3; Status: F                           

      Test: LARGE UNSTAINED CELL #; Value: 0.1; Range: 0.0-0.4; Units: K/mm3; Status: F           

Lab Order: Cardiac Injury Profile; SPEC'17 00:49                                          

      Test: CPK CREATINE PHOSPHOKINASE; Value: 82; Range: ; Units: U/L; Status: F           

      Test: CK-MB VALUE MASS; Value: 1.0; Range: 0.0-3.6; Units: NG/ML; Status: F                 

      Test: MB/CK RELATIVE INDEX; Value: 1.21; Range: < OR =4; Status: F                          

      Test Note: &nbsp;; DIAGNOSIS CRITERIA MMB ng/ml Relative Index (RI) NON-AMI < or = 5      

      N/A GRAY ZONE > 5 < or = 4 AMI > 5 > 4                                                      

Lab Order: Lipase; SPEC'M 17 00:49                                                          

      Test: LIPASE; Value: 165; Range: ; Units: U/L; Status: F                              

Lab Order: Liver Profile; SPEC'M 17 00:49                                                   

      Test: AST/SGOT; Value: 39; Range: 15-37; Abnormal: Above high normal; Units: U/L;           

      Status: F                                                                                   

      Test: ALT/SGPT; Value: 45; Range: 12-78; Units: U/L; Status: F                              

      Test: ALKALINE PHOSPHATASE; Value: 88; Range: ; Units: U/L; Status: F                 

      Test: BILIRUBIN,TOTAL; Value: 0.4; Range: 0.2-1.0; Units: MG/DL; Status: F                  

      Test: BILIRUBIN,DIRECT; Value: < 0.1; Range: 0.0-0.2; Units: MG/DL; Status: F               

      Test: TOTAL PROTEIN; Value: 7.2; Range: 6.4-8.2; Units: GM/DL; Status: F                    

      Test: ALBUMIN; Value: 4.1; Range: 3.2-5.2; Units: GM/DL; Status: F                          

      Test: ALBUMIN/GLOBULIN RATIO; Value: 1.32; Range: 1.00-1.93; Status: F                      

Lab Order: Troponin; SPEC'M 17 00:49                                                        

      Test: TROPONIN I; Value: < 0.02; Range: < 0.10; Units: NG/ML; Status: F                     

      Test Note: &nbsp;; Troponin I Reference Interval for Siemens Eligible LOCI: 99th             

      Percentile= 0.00-0.045 ng/ml Risk Stratification: <= 0.10 ng/ml Decreased Risk for          

      Adverse Clinical Events. 0.10-1.50 ng/ml Increased Risk for Adverse Clinical Events.        

      Evaluation of additional criterion and/or repeat testing in 2-6 hours is suggested to       

      rule out myocardial damage. >= 1.50 ng/ml Indicative of Myocardial Injury.                  

Lab Order: Magnesium Level; SPEC'M 17 00:49                                                 

      Test: MAGNESIUM LEVEL; Value: 1.7; Range: 1.8-2.4; Abnormal: Below low normal; Units:       

      MG/DL; Status: F                                                                            

                                                                                                  

Outcome:                                                                                          

03:24 Discharge ordered by Provider.                                                          mm11

04:01 Discharge Assessment: Patient awake, alert and oriented x 3. No cognitive and/or        js15

      functional deficits noted. Patient verbalized understanding of disposition                  

      instructions. patient administered narcotics - no. The following High Risk Discharge        

      criteria are identified: None. Discharged to home via wheelchair. Condition: stable.        

      Discharge instructions given to patient, Instructed on discharge instructions, follow       

      up and referral plans. medication usage, Demonstrated understanding of instructions,        

      medications, Pt was receptive of discharge instructions/ teaching. Prescriptions given      

      X 1. No special radiology studies were completed. Property sent home with patient.          

04:01 Patient left the ED.                                                                    js15

                                                                                                  

Signatures:                                                                                       

Dispatcher [employer]                           EDMS                                                 

Lopresti, Mary-Elizabeth, Unit Clerk    Unit ml3                                                  

Bob Jacob,                     DO   mm11                                                 

Christina Pruett, PCA                     PCA  [place]                                                  

Ursula Anderson,RN                        RN   js15                                                 

Shannon Benites Kathie klr                                                  

                                                                                                  

**************************************************************************************************



*** Chart Complete ***
BRENT

## 2017-02-21 NOTE — EDDOCDS
Physician Documentation                                                                           

[facility]                                                                         

Name: Rupa Hightower                                                                                

Age: 75 yrs                                                                                       

Sex: Female                                                                                       

: 1941                                                                                   

MRN: [id number]                                                                                     

Arrival Date: 2017                                                                          

Time: 23:59                                                                                       

Account#: [id number]                                                                              

Bed 15                                                                                            

Private MD:                                                                                       

Disposition:                                                                                      

17 03:24 Discharged to Home/Self Care. Impression: Nausea and vomiting, Diarrhea,           

unspecified, Hypomagnesemia.                                                                    

- Condition is Stable.                                                                            

- Discharge Instructions: Diarrhea, Hypomagnesemia, Nausea and Vomiting.                          

- Prescriptions for Zofran 4 mg Oral Tablet - take 1 tablet by ORAL route 4 times per             

day As needed; 10 tablet.                                                                       

- Medication Reconciliation, Local Pharmacy Hours form.                                           

- Follow up: Johnathon Fatima; When: Call to arrange an appointment; Reason: Continuance of            

care.                                                                                           

- Problem is an acute exacerbation.                                                               

- Symptoms have improved.                                                                         

                                                                                                  

                                                                                                  

                                                                                                  

Historical:                                                                                       

- Allergies: hydrocodone; lactose intolerant;                                                     

- Home Meds:                                                                                      

1. metoprolol tartrate 50 mg Oral tab 1 tab 2 times per day                                     

2. pantoprazole 40 mg oral TbEC 1 tab once daily                                                

3. montelukast 10 mg oral tab 1 tab once daily                                                  

4. meloxicam 15 mg oral tab 1 tab once daily                                                    

5. epinastine 0.05 % ophthalmic drop 1 drop 2 times per day                                     

6. fluticasone 50 mcg/actuation nasal spsn 2 sprays once daily                                  

7. atorvastatin 10 mg oral tab 3 times a weeks                                                  

8. Vagifem 10 mcg vaginal tab 1 tab 2 times per wk                                              

9. amoxicillin 500 mg Oral cap 4 caps prior to dental visit                                     

10. Vitamin D3 1,000 unit oral cap daily                                                        

11. B-Complex oral tab daily                                                                    

12. glucosamine-chondroitin oral oral                                                           

13. Allegra Allergy oral oral daily                                                             

14. aspirin 81 mg Oral tab 1 tab once daily                                                     

- PMHx: Non-Hodgkin's Lymphoma; congenital hip dislocation (left); Shingles;                      

mononucleosis; Hypertension; High Cholesterol; GERD;                                            

- PSHx: Appendectomy; Cholecystectomy; Hysterectomy; Tonsillectomy; Hip surgery;                  

surgery for endometriosis;                                                                      

- Social history: Smoking status: Patient states was never smoker of tobacco. No                  

barriers to communication noted, The patient speaks fluent English, Speaks                      

appropriately for age.                                                                          

- Family history: Not pertinent.                                                                  

- : The pt / caregiver states he / she is not on anticoagulants. Home medication list             

is obtained from the patient.                                                                   

- Exposure Risk Screening:: None identified.                                                      

                                                                                                  

                                                                                                  

Vital Signs:                                                                                      

                                                                                             

00:07  / 69; Pulse 88; Resp 18; Temp 96.5(TE); Pulse Ox 97% on R/A; Weight 79.38 kg /   austen 

      175 lbs (R); Height 5 ft. 7 in. (170.18 cm) (R);                                            

03:38  / 83; Pulse 107; Resp 18; Temp 98.4(O); Pulse Ox 94% on R/A; Pain 0/10;          austen 

00:07 Body Mass Index 27.41 (79.38 kg, 170.18 cm)                                             austen 

                                                                                                  

MDM:                                                                                              

00:46 Ondansetron 4 mg IVP once ordered.                                                      mm11

00:46 IV Saline Lock ordered.                                                                 mm11

00:46 Undress patient appropriately for examination ordered.                                  mm11

00:46 LR Solution 1000 ml IV at bolus once ordered.                                           mm11

00:47 Amylase Ordered.                                                                        EDMS

00:47 Basic Metabolic Profile Ordered.                                                        EDMS

00:47 CBC with Diff Ordered.                                                                  EDMS

00:47 Cardiac Injury Profile Ordered.                                                         EDMS

00:47 Lipase Ordered.                                                                         EDMS

00:47 Liver Profile Ordered.                                                                  EDMS

00:47 Troponin Ordered.                                                                       EDMS

00:47 Magnesium Level Ordered.                                                                EDMS

00:47 NOTHING BY MOUTH+DIET ordered.                                                          EDMS

01:22 Financial registration complete.                                                        pm4 

02:00 Basic Metabolic Profile Reviewed.                                                       mm11

02:00 CBC with Diff Reviewed.                                                                 mm11

02:00 Liver Profile Reviewed.                                                                 mm11

02:00 Magnesium Level Reviewed.                                                               mm11

02:00 Amylase Reviewed.                                                                       mm11

02:00 Cardiac Injury Profile Reviewed.                                                        mm11

02:00 Lipase Reviewed.                                                                        mm11

02:00 Troponin Reviewed.                                                                      mm11

03:14 Magnesium Oxide 400 mg PO once ordered.                                                 mm11

03:17 [facility] Payment Agreement was scanned into [employer] and attached to record.               gjb 

17:12 T-Sheet-- Draft Copy was scanned into [employer] and attached to record.                   klr 

                                                                                                  

Administered Medications:                                                                         

00:56 Drug: Ondansetron 4 mg [ondansetron HCl 2 mg/mL intravenous solution (2 mL)] Route:     js15

      IVP; Site: left antecubital;                                                                

00:56 Drug: LR 1000 ml [lactated ringers intravenous solution] Route: IV; Rate: bolus; Site:  js15

      left antecubital;                                                                           

03:58 Follow up: IV Intake: 600ml                                                             js15

03:58 Drug: Magnesium Oxide 400 mg [magnesium oxide 400 mg tablet (1 tabs)] Route: PO;        js15

                                                                                                  

                                                                                                  

Signatures:                                                                                       

Dispatcher [employer]                           Bob Gudino DO                    DO   mm11                                                 

Ursula Anderson,RN                        RN   js15                                                 

Shannon Benites Kathie klr Montondo, Paul, Reg                     Reg  pm4                                                  

                                                                                                  

The chart was reviewed and I authenticate all verbal orders and agree with the evaluation and 
treatment provided.Attachments:

03:17 [facility] Payment Agreement                                                                gjmaria victoria 

17:12 T-Sheet-- Draft Copy                                                                    klr 

                                                                                                  

**************************************************************************************************



*** Chart Complete ***
MTDD

## 2017-04-01 NOTE — HPE
DATE OF ADMISSION:  04/01/2017

 

ATTENDING PHYSICIAN: Sabiha Coates MD

 

REASON FOR ADMISSION: Distal right ureteral stone with inadequate pain control.

 

HISTORY OF PRESENT ILLNESS:  The patient is a 75-year-old female who comes into

the emergency room this morning with severe right lower back pain, nausea, chills

and loose stools.  A CT scan of the abdomen and pelvis was done which showed an

obstructing distal left ureteral stone measuring 6.5 mm.  There was a

nonobstructing left renal stone.  Her white blood count was slightly elevated at

13.8.  She was given morphine, but still continued to have pain and did not feel

comfortable going home since she has had trouble taking narcotics because of an

issue with fainting after hydrocodone.  I came into the hospital to see her and

it was decided to bring her to the operating room for further surgical

management.

 

This is the first stone episode for Rupa.  She denies any history of gross

hematuria or burning with urination.  She has only had one urinary tract

infection in her life.  She denies any other significant irritative or

obstructive voiding symptoms.

 

PAST MEDICAL HISTORY:

Non-Hodgkin B cell lymphoma.

High blood pressure.

Endometriosis.

Hypercholesterolemia.

There was a questionable history of a heart attack, but she was told that it was

not a true heart attack.

Congenital left hip dislocation.

Shingles.

Mononucleosis.

 

PAST SURGICAL HISTORY:

Appendectomy.

Cholecystectomy.

Tonsillectomy.

Hip surgery.

Oophorectomy.

 

MEDICATIONS:

- metoprolol

- pantoprazole

- monotlucast.

- meloxicam

- epinastine

- fluticasone nasal spray

- atorvastatin

- Vagifem

- vitamin D

- Allegra

- aspirin 81 mg daily

 

SOCIAL HISTORY: She never smoked cigarettes and she denies the use of IV drugs.

She lives alone.

 

FAMILY HISTORY: Her brother has a history of kidney stones.

 

ALLERGIES: HYDROCODONE. She fainted once, but this was after chemotherapy and

PROPOFOL, she had significant vomiting after a procedure where she was given

propofol.

 

REVIEW OF SYSTEMS:  A full 12 system review was done.  All this is only positive

for lactose intolerance, occasional diarrhea, history of endometriosis and

history of back pain, now doing fine.

 

PHYSICAL EXAMINATION: This is a well-developed, well-nourished female lying in a

hospital bed in the emergency room, in no apparent respiratory distress.  She is

alert and oriented x3.  She is afebrile.  Her blood pressure is 158/78 and her

pulse is 86.  Her head is normocephalic, atraumatic.  Her trachea is midline.

She has no significant supraclavicular or cervical adenopathy.  She does have

some right CVA tenderness.  Her abdomen is otherwise soft and nontender without

any rebound or guarding.  Her extremities show no cyanosis, clubbing or edema.

 

LABORATORY DATA: Her white blood count is elevated a 13.8.  Her hemoglobin and

hematocrit is 15.9 over 49.3.  Her serum calcium level is 8.7 and her creatinine

is 1.17 with a BUN of 22, which looks a little prerenal. A urinalysis does show

107 white blood cells per high power field with 30 red blood cells and some

bacteria.  A urine culture is pending.

 

CT scan of the abdomen and pelvis done without contrast on 04/01/2017 shows acute

moderate right-sided obstructive uropathy with a 6 mm calculus in the distal

right ureter and a nonobstructing 3 mm left renal calculus.  There was also some

diverticulosis and extensive degenerative changes involving the visualized

thoracolumbar spine and left hip.

 

DISCUSSION: I discussed these findings at length with the patient in the hospital

today.  We discussed different options available including sending her home with

antibiotics and pain medication, admission for IV hydration and pain control,

versus immediate surgical management.  She elected immediate surgical management

since she does live alone and wants to get this taken care of.  We discussed

exactly how the procedure of right ureteroscopy, laser lithotripsy, stone

basketing and stent placement is done and what to expect both pre and post

procedurally.  We discussed the major risks of the procedure, which included but

was not limited to the risks of general anesthesia, reactions to medication,

bleeding, infection, stent pain, inability to get the stone and injury to the

organs in the area.  Informed consent was obtained in both verbal and written

form and the patient clearly understands that she will need to come to the office

to get the stent removed by cystoscopic examination.

 

IMPRESSION:

1.  Distal right ureteral stone measuring 6 mm in a patient who does not tolerate

by mouth (p.o.) pain medication well and elected for emergent surgical

management.

 

2. Nonobstructing 3 mm left renal stone in a patient who has never had a stone

before.

 

3. Medical history including non-Hodgkins lymphoma, high blood pressure and hip

issues and degenerative changes seen on CT.

 

PLAN:

1. Bring the patient to the operating room now for cystoscopy, right

ureteroscopy, right laser lithotripsy and/or stone basketing and placement of a

right double-J ureteral stent.

2. The patient would like to have 24-hour observation since there is no one who

can stay with her tonight.  We will plan on giving her postprocedural

antibiotics.

## 2017-04-01 NOTE — REP
Clinical:  Abdominal pain.

 

Comparison:  08/15/2012.

 

Findings:

Acute moderate right-sided obstructive uropathy with hydroureteronephrosis and

periureteral stranding secondary to a 6 mm obstructing calculus in the distal

right ureter (images 115 - 116).  Nonobstructing 3 mm left renal calculus

identified.  Evaluation of the bladder and pelvis is limited due to metallic

streak artifact from right hip prosthesis.

 

Liver, spleen, pancreas, bilateral adrenal glands are normal for noncontrast

examination.  The patient is status post cholecystectomy.  The enteric system is

without obstruction or acute inflammatory process.  Diverticulosis noted without

acute diverticulitis.  Pelvis demonstrates grossly normal bladder and

uterus/adnexa.  No ascites.  No free air.  No adenopathy.  Musculoskeletal

structures demonstrate degenerative changes.

 

Impression:

1.  Acute moderate right-sided obstructive uropathy with 6 mm calculus in the

distal right ureter.  Nonobstructing 3 mm left renal calculus.

2.  Diverticulosis.

3.  Extensive degenerative changes involving the visualized thoracolumbar spine

and left hip.

 

 

Signed by

Shaquille Steven MD 04/01/2017 11:30 A

## 2017-04-02 NOTE — RO
DATE OF PROCEDURE:  04/01/2017

 

PREOPERATIVE DIAGNOSIS: Distal 6 mm right ureteral stone.

 

POSTOPERATIVE DIAGNOSIS: Distal 6 mm right ureteral stone.

The same

 

OPERATIVE PROCEDURE: Cystoscopy, attempted but failed right ureteroscopy, and 
placement of a

#7-Bruneian double-J ureteral stent on the right side.

 

SURGEON: Dr. Sabiha Coates

 

ANESTHESIA: General.

 

MEDICATIONS: Ancef 2 grams preoperatively.

 

DRAINS: #7-Bruneian double-J ureteral stent.

 

COMPLICATIONS: Inability to do ureteroscopy because the patient had severe hip

dysplasia on the left hand side and a total hip on the right hand side and

requested that we place her legs preoperatively before she went to sleep and I

was unable to have any leverage on the ureteroscope in order to do ureteroscopy.

 

INDICATIONS FOR PROCEDURE: The patient is a 75-year-old female who had severe

right lower back pain this morning with some nausea, chills and diarrhea.  She

came to the emergency room and a CT scan of the abdomen and pelvis was done 
which

showed a 6 mm distal right ureteral stone.  Because she has had difficulty with

by mouth (p.o.) pain medications in the past, she did not feel comfortable going

home.  After discussing all different options, alternatives, risks and benefits

it was decided to bring her to the operating room for right ureteroscopy and

laser lithotripsy and/or stone basketing with stent placement.  All options,

alternatives, risks and benefits had been discussed and informed consent was

obtained in both verbal and written form.

 

DESCRIPTION OF PROCEDURE: The patient was brought into the operating room.

Sequential compression devices were in place and preoperative antibiotics had

been given. Prior to induction of anesthesia, she wanted to be placed in the

lithotomy position awake.  Unfortunately she only felt comfortable with her legs

very close together.  We tried to lift her legs both high so I can get under 
with

a ureteroscope that way or low so I could get over it, but none of these were

comfortable for her.  We finally chose a comfortable position and attempted the

procedure.

 

At this point, general anesthesia was induced and she was prepped and draped in

the usual fashion.  First a #21-Bruneian cystoscope was inserted.  The urethra was

noted to be open without any evidence of lesions or strictures.  Upon entering

the bladder, it was hard even to see the ureteral orifices because I had no

lateral movements of the scope. I was able to get a glimpse of the right 
ureteral

orifice though and finally was able to place a wire up to the right kidney under

fluoroscopy.  At this point, I placed a second wire and attempted ureteroscopy.

With the semi-rigid ureteroscope, I was unable to get any traction in order to

get into the right ureter.  At this point we tried to take her leg out of the

stirrup and hold it in the same positioning, where I could get a little more

access but unfortunately we were unable to do this.  Because the leg was now out

of the stirrups I had wanted to try to proceed with flexible ureteroscopy, but I

felt that not harming her hips was more important.  I therefore made the 
decision

just to place a ureteral stent and allow the ureter to dilate and either make 
the

decision to pull the stent in approximately 2 weeks hoping that the stone comes

with it versus considering extracorporeal shock wave lithotripsy (ESWL) in the

future if the stone is well seen.

BRENT

## 2017-04-03 NOTE — REP
Retrograde pyelogram:

 

A single intraoperative fluoroscopic views performed during placement of a right

ureteral stent.

 

The proximal and distal pigtails are in satisfactory locations.

 

There is a total right hip arthroplasty.

 

There is lumbar scoliosis convex right.

 

Fluoroscopic exposure time is 0.06 minutes.  Fluoroscopic images are performed

last image hold technology.  This requires no additional radiation.

 

 

Signed by

Trevor Albert MD 04/03/2017 08:36 A

## 2017-05-04 NOTE — REP
KUB, TWO VIEWS:

 

HISTORY: Kidney stone.

 

Air is present in small and large intestine. There are no air fluid levels or

dilated loops of intestine.  There is no pneumoperitoneum.  A right ureteral

stent is present. There is no definite nephrolithiasis.  Degenerative change is

present in the lumbar spine.  Surgical clips are present in the right upper

quadrant. The the patient is status post right hip arthroplasty.

 

IMPRESSION:

 

1.  Nonspecific bowel gas.

 

2.  There is no definite nephrolithiasis.

 

 

Signed by

Jet Werner MD 05/04/2017 01:39 P